# Patient Record
Sex: FEMALE | Race: WHITE | NOT HISPANIC OR LATINO
[De-identification: names, ages, dates, MRNs, and addresses within clinical notes are randomized per-mention and may not be internally consistent; named-entity substitution may affect disease eponyms.]

---

## 2017-09-11 ENCOUNTER — APPOINTMENT (OUTPATIENT)
Dept: RADIOLOGY | Facility: CLINIC | Age: 37
End: 2017-09-11

## 2017-09-11 ENCOUNTER — OUTPATIENT (OUTPATIENT)
Dept: OUTPATIENT SERVICES | Facility: HOSPITAL | Age: 37
LOS: 1 days | End: 2017-09-11
Payer: COMMERCIAL

## 2017-09-11 DIAGNOSIS — Z00.8 ENCOUNTER FOR OTHER GENERAL EXAMINATION: ICD-10-CM

## 2017-09-11 PROCEDURE — 73502 X-RAY EXAM HIP UNI 2-3 VIEWS: CPT | Mod: 26,RT

## 2017-09-11 PROCEDURE — 73502 X-RAY EXAM HIP UNI 2-3 VIEWS: CPT

## 2018-10-09 ENCOUNTER — RESULT REVIEW (OUTPATIENT)
Age: 38
End: 2018-10-09

## 2020-02-23 ENCOUNTER — TRANSCRIPTION ENCOUNTER (OUTPATIENT)
Age: 40
End: 2020-02-23

## 2020-10-01 ENCOUNTER — TRANSCRIPTION ENCOUNTER (OUTPATIENT)
Age: 40
End: 2020-10-01

## 2020-10-01 VITALS
TEMPERATURE: 98 F | SYSTOLIC BLOOD PRESSURE: 98 MMHG | DIASTOLIC BLOOD PRESSURE: 65 MMHG | WEIGHT: 136.25 LBS | OXYGEN SATURATION: 100 % | HEIGHT: 63 IN | HEART RATE: 58 BPM | RESPIRATION RATE: 16 BRPM

## 2020-10-02 ENCOUNTER — RESULT REVIEW (OUTPATIENT)
Age: 40
End: 2020-10-02

## 2020-10-02 ENCOUNTER — INPATIENT (INPATIENT)
Facility: HOSPITAL | Age: 40
LOS: 0 days | Discharge: HOME | DRG: 334 | End: 2020-10-03
Attending: OBSTETRICS & GYNECOLOGY | Admitting: OBSTETRICS & GYNECOLOGY
Payer: COMMERCIAL

## 2020-10-02 DIAGNOSIS — Z90.49 ACQUIRED ABSENCE OF OTHER SPECIFIED PARTS OF DIGESTIVE TRACT: Chronic | ICD-10-CM

## 2020-10-02 DIAGNOSIS — N94.6 DYSMENORRHEA, UNSPECIFIED: ICD-10-CM

## 2020-10-02 DIAGNOSIS — Z91.040 LATEX ALLERGY STATUS: ICD-10-CM

## 2020-10-02 DIAGNOSIS — N80.5 ENDOMETRIOSIS OF INTESTINE: ICD-10-CM

## 2020-10-02 DIAGNOSIS — M50.30 OTHER CERVICAL DISC DEGENERATION, UNSPECIFIED CERVICAL REGION: ICD-10-CM

## 2020-10-02 DIAGNOSIS — M43.16 SPONDYLOLISTHESIS, LUMBAR REGION: ICD-10-CM

## 2020-10-02 DIAGNOSIS — O00.90 UNSPECIFIED ECTOPIC PREGNANCY WITHOUT INTRAUTERINE PREGNANCY: Chronic | ICD-10-CM

## 2020-10-02 DIAGNOSIS — F41.9 ANXIETY DISORDER, UNSPECIFIED: ICD-10-CM

## 2020-10-02 DIAGNOSIS — N80.3 ENDOMETRIOSIS OF PELVIC PERITONEUM: ICD-10-CM

## 2020-10-02 LAB
BLD GP AB SCN SERPL QL: NEGATIVE — SIGNIFICANT CHANGE UP
RH IG SCN BLD-IMP: POSITIVE — SIGNIFICANT CHANGE UP

## 2020-10-02 PROCEDURE — 88302 TISSUE EXAM BY PATHOLOGIST: CPT | Mod: 26

## 2020-10-02 PROCEDURE — 88305 TISSUE EXAM BY PATHOLOGIST: CPT | Mod: 26

## 2020-10-02 RX ORDER — KETOROLAC TROMETHAMINE 30 MG/ML
30 SYRINGE (ML) INJECTION EVERY 6 HOURS
Refills: 0 | Status: DISCONTINUED | OUTPATIENT
Start: 2020-10-02 | End: 2020-10-03

## 2020-10-02 RX ORDER — SIMETHICONE 80 MG/1
80 TABLET, CHEWABLE ORAL ONCE
Refills: 0 | Status: DISCONTINUED | OUTPATIENT
Start: 2020-10-02 | End: 2020-10-03

## 2020-10-02 RX ORDER — POLYETHYLENE GLYCOL 3350 17 G/17G
17 POWDER, FOR SOLUTION ORAL ONCE
Refills: 0 | Status: DISCONTINUED | OUTPATIENT
Start: 2020-10-02 | End: 2020-10-03

## 2020-10-02 RX ORDER — FAMOTIDINE 10 MG/ML
20 INJECTION INTRAVENOUS ONCE
Refills: 0 | Status: COMPLETED | OUTPATIENT
Start: 2020-10-02 | End: 2020-10-02

## 2020-10-02 RX ORDER — SODIUM CHLORIDE 9 MG/ML
1000 INJECTION, SOLUTION INTRAVENOUS
Refills: 0 | Status: DISCONTINUED | OUTPATIENT
Start: 2020-10-02 | End: 2020-10-03

## 2020-10-02 RX ORDER — ACETAMINOPHEN 500 MG
1000 TABLET ORAL EVERY 6 HOURS
Refills: 0 | Status: DISCONTINUED | OUTPATIENT
Start: 2020-10-02 | End: 2020-10-03

## 2020-10-02 RX ORDER — HYDROMORPHONE HYDROCHLORIDE 2 MG/ML
0.5 INJECTION INTRAMUSCULAR; INTRAVENOUS; SUBCUTANEOUS EVERY 4 HOURS
Refills: 0 | Status: DISCONTINUED | OUTPATIENT
Start: 2020-10-02 | End: 2020-10-03

## 2020-10-02 RX ORDER — MEPERIDINE HYDROCHLORIDE 50 MG/ML
12.5 INJECTION INTRAMUSCULAR; INTRAVENOUS; SUBCUTANEOUS ONCE
Refills: 0 | Status: DISCONTINUED | OUTPATIENT
Start: 2020-10-02 | End: 2020-10-02

## 2020-10-02 RX ORDER — ONDANSETRON 8 MG/1
8 TABLET, FILM COATED ORAL EVERY 6 HOURS
Refills: 0 | Status: DISCONTINUED | OUTPATIENT
Start: 2020-10-02 | End: 2020-10-03

## 2020-10-02 RX ORDER — METOCLOPRAMIDE HCL 10 MG
10 TABLET ORAL EVERY 6 HOURS
Refills: 0 | Status: DISCONTINUED | OUTPATIENT
Start: 2020-10-02 | End: 2020-10-03

## 2020-10-02 RX ORDER — ACETAMINOPHEN 500 MG
1000 TABLET ORAL ONCE
Refills: 0 | Status: COMPLETED | OUTPATIENT
Start: 2020-10-02 | End: 2020-10-02

## 2020-10-02 RX ORDER — GABAPENTIN 400 MG/1
600 CAPSULE ORAL ONCE
Refills: 0 | Status: COMPLETED | OUTPATIENT
Start: 2020-10-02 | End: 2020-10-02

## 2020-10-02 RX ORDER — BUPIVACAINE 13.3 MG/ML
20 INJECTION, SUSPENSION, LIPOSOMAL INFILTRATION ONCE
Refills: 0 | Status: DISCONTINUED | OUTPATIENT
Start: 2020-10-02 | End: 2020-10-03

## 2020-10-02 RX ORDER — HEPARIN SODIUM 5000 [USP'U]/ML
5000 INJECTION INTRAVENOUS; SUBCUTANEOUS ONCE
Refills: 0 | Status: COMPLETED | OUTPATIENT
Start: 2020-10-02 | End: 2020-10-02

## 2020-10-02 RX ORDER — HYDROMORPHONE HYDROCHLORIDE 2 MG/ML
0.5 INJECTION INTRAMUSCULAR; INTRAVENOUS; SUBCUTANEOUS
Refills: 0 | Status: DISCONTINUED | OUTPATIENT
Start: 2020-10-02 | End: 2020-10-02

## 2020-10-02 RX ORDER — SCOPALAMINE 1 MG/3D
1 PATCH, EXTENDED RELEASE TRANSDERMAL
Refills: 0 | Status: DISCONTINUED | OUTPATIENT
Start: 2020-10-02 | End: 2020-10-03

## 2020-10-02 RX ORDER — CELECOXIB 200 MG/1
400 CAPSULE ORAL ONCE
Refills: 0 | Status: COMPLETED | OUTPATIENT
Start: 2020-10-02 | End: 2020-10-02

## 2020-10-02 RX ADMIN — Medication 30 MILLIGRAM(S): at 11:47

## 2020-10-02 RX ADMIN — Medication 30 MILLIGRAM(S): at 12:15

## 2020-10-02 RX ADMIN — SCOPALAMINE 1 PATCH: 1 PATCH, EXTENDED RELEASE TRANSDERMAL at 18:47

## 2020-10-02 RX ADMIN — HYDROMORPHONE HYDROCHLORIDE 0.5 MILLIGRAM(S): 2 INJECTION INTRAMUSCULAR; INTRAVENOUS; SUBCUTANEOUS at 14:44

## 2020-10-02 RX ADMIN — HEPARIN SODIUM 5000 UNIT(S): 5000 INJECTION INTRAVENOUS; SUBCUTANEOUS at 07:19

## 2020-10-02 RX ADMIN — HYDROMORPHONE HYDROCHLORIDE 0.5 MILLIGRAM(S): 2 INJECTION INTRAMUSCULAR; INTRAVENOUS; SUBCUTANEOUS at 20:41

## 2020-10-02 RX ADMIN — Medication 10 MILLIGRAM(S): at 15:38

## 2020-10-02 RX ADMIN — HYDROMORPHONE HYDROCHLORIDE 0.5 MILLIGRAM(S): 2 INJECTION INTRAMUSCULAR; INTRAVENOUS; SUBCUTANEOUS at 15:40

## 2020-10-02 RX ADMIN — Medication 30 MILLIGRAM(S): at 17:31

## 2020-10-02 RX ADMIN — CELECOXIB 400 MILLIGRAM(S): 200 CAPSULE ORAL at 14:44

## 2020-10-02 RX ADMIN — SCOPALAMINE 1 PATCH: 1 PATCH, EXTENDED RELEASE TRANSDERMAL at 19:33

## 2020-10-02 RX ADMIN — Medication 1000 MILLIGRAM(S): at 07:20

## 2020-10-02 RX ADMIN — HYDROMORPHONE HYDROCHLORIDE 0.5 MILLIGRAM(S): 2 INJECTION INTRAMUSCULAR; INTRAVENOUS; SUBCUTANEOUS at 21:32

## 2020-10-02 RX ADMIN — HYDROMORPHONE HYDROCHLORIDE 0.5 MILLIGRAM(S): 2 INJECTION INTRAMUSCULAR; INTRAVENOUS; SUBCUTANEOUS at 11:47

## 2020-10-02 RX ADMIN — Medication 1000 MILLIGRAM(S): at 14:44

## 2020-10-02 RX ADMIN — HYDROMORPHONE HYDROCHLORIDE 0.5 MILLIGRAM(S): 2 INJECTION INTRAMUSCULAR; INTRAVENOUS; SUBCUTANEOUS at 12:58

## 2020-10-02 RX ADMIN — Medication 1000 MILLIGRAM(S): at 14:51

## 2020-10-02 RX ADMIN — Medication 30 MILLIGRAM(S): at 17:55

## 2020-10-02 RX ADMIN — SODIUM CHLORIDE 125 MILLILITER(S): 9 INJECTION, SOLUTION INTRAVENOUS at 14:56

## 2020-10-02 RX ADMIN — HYDROMORPHONE HYDROCHLORIDE 0.5 MILLIGRAM(S): 2 INJECTION INTRAMUSCULAR; INTRAVENOUS; SUBCUTANEOUS at 16:37

## 2020-10-02 RX ADMIN — FAMOTIDINE 20 MILLIGRAM(S): 10 INJECTION INTRAVENOUS at 18:48

## 2020-10-02 RX ADMIN — Medication 1000 MILLIGRAM(S): at 15:36

## 2020-10-02 RX ADMIN — GABAPENTIN 600 MILLIGRAM(S): 400 CAPSULE ORAL at 07:19

## 2020-10-02 RX ADMIN — HYDROMORPHONE HYDROCHLORIDE 0.5 MILLIGRAM(S): 2 INJECTION INTRAMUSCULAR; INTRAVENOUS; SUBCUTANEOUS at 13:35

## 2020-10-02 RX ADMIN — HYDROMORPHONE HYDROCHLORIDE 0.5 MILLIGRAM(S): 2 INJECTION INTRAMUSCULAR; INTRAVENOUS; SUBCUTANEOUS at 13:51

## 2020-10-02 RX ADMIN — CELECOXIB 400 MILLIGRAM(S): 200 CAPSULE ORAL at 07:20

## 2020-10-02 RX ADMIN — ONDANSETRON 8 MILLIGRAM(S): 8 TABLET, FILM COATED ORAL at 20:41

## 2020-10-02 RX ADMIN — ONDANSETRON 8 MILLIGRAM(S): 8 TABLET, FILM COATED ORAL at 14:51

## 2020-10-02 RX ADMIN — HYDROMORPHONE HYDROCHLORIDE 0.5 MILLIGRAM(S): 2 INJECTION INTRAMUSCULAR; INTRAVENOUS; SUBCUTANEOUS at 12:15

## 2020-10-02 NOTE — H&P ADULT - NSICDXPASTMEDICALHX_GEN_ALL_CORE_FT
PAST MEDICAL HISTORY:  DDD (degenerative disc disease), cervical     Endometriosis     Migraines

## 2020-10-02 NOTE — PROGRESS NOTE ADULT - SUBJECTIVE AND OBJECTIVE BOX
GYN POST-OPERATIVE CHECK  Patient seen at bedside.  Pain moderately controlled. Tolerating sips.  No OOB yet.  Jimenez in place draining clear yellow urine.    Denies CP, palpitations, SOB, fever, chills. Reports nausea, patient gotten reglan and zofran.    Vital Signs Last 24 Hrs  T(C): 36.3 (02 Oct 2020 15:42), Max: 36.6 (02 Oct 2020 14:12)  T(F): 97.3 (02 Oct 2020 15:42), Max: 97.8 (02 Oct 2020 14:12)  HR: 88 (02 Oct 2020 15:42) (60 - 88)  BP: 106/62 (02 Oct 2020 15:42) (101/56 - 204/138)  BP(mean): 77 (02 Oct 2020 13:33) (77 - 166)  RR: 19 (02 Oct 2020 15:42) (18 - 37)  SpO2: 98% (02 Oct 2020 15:42) (97% - 100%)    Physical Exam:  Gen: No Acute Distress  Pulm: Clear to auscultation bilaterally  GI: soft, appropriately tender, non-distended, +BS, no rebound, no guarding.  Dressing C/D/I  : Jimenez in place  Ext: SCDs in place, no edema/erythema/tenderness    I&O's Summary    02 Oct 2020 07:01  -  02 Oct 2020 17:10  --------------------------------------------------------  IN: 625 mL / OUT: 415 mL / NET: 210 mL      MEDICATIONS  (STANDING):  acetaminophen   Tablet .. 1000 milliGRAM(s) Oral every 6 hours  BUpivacaine liposome 1.3% Injectable (no eMAR) 20 milliLiter(s) Local Injection Once  ketorolac   Injectable 30 milliGRAM(s) IV Push every 6 hours  lactated ringers. 1000 milliLiter(s) (125 mL/Hr) IV Continuous <Continuous>    MEDICATIONS  (PRN):  HYDROmorphone  Injectable 0.5 milliGRAM(s) IV Push every 4 hours PRN Severe Pain (7 - 10)  metoclopramide Injectable 10 milliGRAM(s) IV Push every 6 hours PRN vomiting  ondansetron Injectable 8 milliGRAM(s) IV Push every 6 hours PRN Nausea  polyethylene glycol 3350 17 Gram(s) Oral Once PRN Constipation  simethicone 80 milliGRAM(s) Chew Once PRN Gas    Allergies    Chicken (Other)  latex (Other)  Vicodin (Other)    Intolerances        LABS:

## 2020-10-02 NOTE — H&P ADULT - HISTORY OF PRESENT ILLNESS
39y  with Last Menstrual Period  presenting for L/S endo resection.  Known endometriosis with multiple GI symptoms.  Denies fever, chills, chest pain, palpitations, SOB, n/v.  +flatus, +BM    OB H/x:  EUP treated with right salpingectomy    GYN H/x:  Endometriosis    MED H/x:  Spondolythesis (L5 bulging disc)    SURG H/x:  2013 - laparotomy for EUP  2016 L/s cholecystectomy    Medications:  None    Allergies:   Vicodin (acne and itching)     Vital Signs Last 24 Hrs  T(C): 36.3 (02 Oct 2020 15:42), Max: 36.6 (02 Oct 2020 14:12)  T(F): 97.3 (02 Oct 2020 15:42), Max: 97.8 (02 Oct 2020 14:12)  HR: 88 (02 Oct 2020 15:42) (60 - 88)  BP: 106/62 (02 Oct 2020 15:42) (101/56 - 204/138)  BP(mean): 77 (02 Oct 2020 13:33) (77 - 166)  RR: 19 (02 Oct 2020 15:42) (18 - 37)  SpO2: 98% (02 Oct 2020 15:42) (97% - 100%)    Physical Exam:  Gen: NAD, comfortable  GI: soft, nontender, nondistended + BS, no rebound no guarding  Ext: no edema, erythema, tenderness     LABS:  HB 12.6  Cr 0.76

## 2020-10-02 NOTE — BRIEF OPERATIVE NOTE - OPERATION/FINDINGS
L/S Endo-resection with appendectomy.  s/p previous right salpingectomy, otherwise normal anatomy.  The diaphragm was inspected to lesions or defects.  Normal left adnexa and normal right ovary.  Peritoneal lesions were removed.

## 2020-10-02 NOTE — PROGRESS NOTE ADULT - ASSESSMENT
39y Female POD# 0 s/p L/S endo resection and appendectomy with exploration of the diaphragm. Bedrest until ovarian suspensions are removed tomorrow morning.                                   1. Neuro/Pain:  PCA. Zofran and reglan for N/V  2  CV:   VS per routine  3. Pulm: Encourage ISS  4. GI: NPO + Sips, Advance in the AM  5. :  Jimenez in place, TOV in AM  6. Heme: AM CBC  7. DVT ppx: SCDs  8. Dispo: Continue inpatient monitoring

## 2020-10-02 NOTE — BRIEF OPERATIVE NOTE - NSICDXBRIEFPROCEDURE_GEN_ALL_CORE_FT
PROCEDURES:  Laparoscopic appendectomy 02-Oct-2020 10:59:01  Kale Chappell  Laparoscopic removal of endometriosis 02-Oct-2020 10:58:52  Kale Chappell

## 2020-10-03 ENCOUNTER — TRANSCRIPTION ENCOUNTER (OUTPATIENT)
Age: 40
End: 2020-10-03

## 2020-10-03 VITALS
SYSTOLIC BLOOD PRESSURE: 95 MMHG | RESPIRATION RATE: 18 BRPM | DIASTOLIC BLOOD PRESSURE: 52 MMHG | TEMPERATURE: 99 F | OXYGEN SATURATION: 98 % | HEART RATE: 75 BPM

## 2020-10-03 DIAGNOSIS — Z98.890 OTHER SPECIFIED POSTPROCEDURAL STATES: ICD-10-CM

## 2020-10-03 LAB
ANION GAP SERPL CALC-SCNC: 7 MMOL/L — SIGNIFICANT CHANGE UP (ref 5–17)
BUN SERPL-MCNC: 8 MG/DL — SIGNIFICANT CHANGE UP (ref 7–23)
CALCIUM SERPL-MCNC: 8.5 MG/DL — SIGNIFICANT CHANGE UP (ref 8.4–10.5)
CHLORIDE SERPL-SCNC: 105 MMOL/L — SIGNIFICANT CHANGE UP (ref 96–108)
CO2 SERPL-SCNC: 25 MMOL/L — SIGNIFICANT CHANGE UP (ref 22–31)
CREAT SERPL-MCNC: 0.76 MG/DL — SIGNIFICANT CHANGE UP (ref 0.5–1.3)
GLUCOSE SERPL-MCNC: 108 MG/DL — HIGH (ref 70–99)
HCT VFR BLD CALC: 32.3 % — LOW (ref 34.5–45)
HGB BLD-MCNC: 10.8 G/DL — LOW (ref 11.5–15.5)
MCHC RBC-ENTMCNC: 32.2 PG — SIGNIFICANT CHANGE UP (ref 27–34)
MCHC RBC-ENTMCNC: 33.4 GM/DL — SIGNIFICANT CHANGE UP (ref 32–36)
MCV RBC AUTO: 96.4 FL — SIGNIFICANT CHANGE UP (ref 80–100)
NRBC # BLD: 0 /100 WBCS — SIGNIFICANT CHANGE UP (ref 0–0)
PLATELET # BLD AUTO: 189 K/UL — SIGNIFICANT CHANGE UP (ref 150–400)
POTASSIUM SERPL-MCNC: 4.8 MMOL/L — SIGNIFICANT CHANGE UP (ref 3.5–5.3)
POTASSIUM SERPL-SCNC: 4.8 MMOL/L — SIGNIFICANT CHANGE UP (ref 3.5–5.3)
RBC # BLD: 3.35 M/UL — LOW (ref 3.8–5.2)
RBC # FLD: 11.9 % — SIGNIFICANT CHANGE UP (ref 10.3–14.5)
SODIUM SERPL-SCNC: 137 MMOL/L — SIGNIFICANT CHANGE UP (ref 135–145)
WBC # BLD: 14.61 K/UL — HIGH (ref 3.8–10.5)
WBC # FLD AUTO: 14.61 K/UL — HIGH (ref 3.8–10.5)

## 2020-10-03 RX ORDER — IBUPROFEN 200 MG
600 TABLET ORAL EVERY 6 HOURS
Refills: 0 | Status: DISCONTINUED | OUTPATIENT
Start: 2020-10-03 | End: 2020-10-03

## 2020-10-03 RX ORDER — IBUPROFEN 200 MG
1 TABLET ORAL
Qty: 0 | Refills: 0 | DISCHARGE
Start: 2020-10-03

## 2020-10-03 RX ADMIN — Medication 30 MILLIGRAM(S): at 00:27

## 2020-10-03 RX ADMIN — HYDROMORPHONE HYDROCHLORIDE 0.5 MILLIGRAM(S): 2 INJECTION INTRAMUSCULAR; INTRAVENOUS; SUBCUTANEOUS at 04:39

## 2020-10-03 RX ADMIN — Medication 600 MILLIGRAM(S): at 11:25

## 2020-10-03 RX ADMIN — Medication 30 MILLIGRAM(S): at 07:34

## 2020-10-03 RX ADMIN — SODIUM CHLORIDE 125 MILLILITER(S): 9 INJECTION, SOLUTION INTRAVENOUS at 00:25

## 2020-10-03 RX ADMIN — Medication 30 MILLIGRAM(S): at 07:06

## 2020-10-03 RX ADMIN — ONDANSETRON 8 MILLIGRAM(S): 8 TABLET, FILM COATED ORAL at 03:27

## 2020-10-03 RX ADMIN — SCOPALAMINE 1 PATCH: 1 PATCH, EXTENDED RELEASE TRANSDERMAL at 07:09

## 2020-10-03 RX ADMIN — Medication 30 MILLIGRAM(S): at 00:45

## 2020-10-03 RX ADMIN — HYDROMORPHONE HYDROCHLORIDE 0.5 MILLIGRAM(S): 2 INJECTION INTRAMUSCULAR; INTRAVENOUS; SUBCUTANEOUS at 03:27

## 2020-10-03 RX ADMIN — Medication 600 MILLIGRAM(S): at 12:25

## 2020-10-03 NOTE — DISCHARGE NOTE PROVIDER - HOSPITAL COURSE
38yo G0 presented for scheduled operation underwent l/s endo resection and appendectomy w/exploration of the diaphragm (no VATS). Procedure was uncomplicated EBL 50cc post op hg 10.8 stable. Meeting all postoperative milestones. Stable for discharge

## 2020-10-03 NOTE — DISCHARGE NOTE NURSING/CASE MANAGEMENT/SOCIAL WORK - PATIENT PORTAL LINK FT
You can access the FollowMyHealth Patient Portal offered by [facility] by registering at the following website: [email or web address]. By joining [facility]’s FollowMyHealth portal, you will also be able to view your health information using other applications (apps) compatible with our system.

## 2020-10-03 NOTE — DISCHARGE NOTE PROVIDER - CARE PROVIDER_API CALL
Juan Stevens  OBSTETRICS AND GYNECOLOGY  [address] [place] [address]  Phone: [phone]  Fax: [phone]  Follow Up Time:

## 2020-10-03 NOTE — DISCHARGE NOTE PROVIDER - NSDCCPCAREPLAN_GEN_ALL_CORE_FT
PRINCIPAL DISCHARGE DIAGNOSIS  Diagnosis: Postoperative state  Assessment and Plan of Treatment: Postoperative state

## 2020-10-03 NOTE — PROGRESS NOTE ADULT - PROBLEM SELECTOR PLAN 1
Neuro: Pain well controlled; continue regimen continue Zofran and reglan   CV: hemodynamically stable, monitor vitals  Pulm: saturating well on room air, encourage oob/amb  GI: advance diet as tolerated; stop fluids and SLIV   : remove espinoza; patient due to void   Heme: SCDs for DVT prophylaxis; f/u AM CBC   ID: afebrile  Dispo: continue routine post-op care    Hermna Galindo PGY2

## 2020-10-03 NOTE — PROGRESS NOTE ADULT - ASSESSMENT
39y POD#1   s/p l/s endometriosis excision w/ appendectomy and exploration of diaphragm 2/2 without complication. Patient is stable and doing well; nausea improved with antiemetics.

## 2020-10-03 NOTE — PROGRESS NOTE ADULT - SUBJECTIVE AND OBJECTIVE BOX
R2 GYN Progress Note  POD#1   HD#2    Patient seen and examined at bedside. Patient struggled with nausea last night w/ a few episodes of emesis; however much improved Zofran and scopolamine patch. She is now tolerating sips; has not passed flatus yet.     Jimenez is still in place.   Denies CP, SOB, N/V, fevers, and chills, heavy vaginal bleeding.     Vital Signs Last 24 Hours  T(C): 37.2 (10-03-20 @ 05:40), Max: 37.2 (10-03-20 @ 05:40)  HR: 70 (10-03-20 @ 05:40) (59 - 88)  BP: 90/45 (10-03-20 @ 05:40) (90/45 - 204/138)  RR: 18 (10-03-20 @ 05:40) (17 - 37)  SpO2: 97% (10-03-20 @ 05:40) (97% - 100%)    I&O's Summary    02 Oct 2020 07:01  -  03 Oct 2020 06:03  --------------------------------------------------------  IN: 2000 mL / OUT: 865 mL / NET: 1135 mL        Physical Exam:  General: NAD  CV: RR, S1, S2  Lungs: CTA b/l, good air flow b/l   Abdomen: Soft, appropriately tender to palpation, non distended; bowel sounds present in all quadrants  Incision: incision sites, dressing, CDI; steris in place, dry; b/l ovarian suspensions removed   Ext: warm and well perfused    Labs:      MEDICATIONS  (STANDING):  acetaminophen   Tablet .. 1000 milliGRAM(s) Oral every 6 hours  BUpivacaine liposome 1.3% Injectable (no eMAR) 20 milliLiter(s) Local Injection Once  ketorolac   Injectable 30 milliGRAM(s) IV Push every 6 hours  lactated ringers. 1000 milliLiter(s) (125 mL/Hr) IV Continuous <Continuous>  scopolamine 1 mG/72 Hr(s) Patch 1 Patch Transdermal every 72 hours    MEDICATIONS  (PRN):  metoclopramide Injectable 10 milliGRAM(s) IV Push every 6 hours PRN vomiting  ondansetron Injectable 8 milliGRAM(s) IV Push every 6 hours PRN Nausea  polyethylene glycol 3350 17 Gram(s) Oral Once PRN Constipation  simethicone 80 milliGRAM(s) Chew Once PRN Gas

## 2020-10-05 PROCEDURE — 86901 BLOOD TYPING SEROLOGIC RH(D): CPT

## 2020-10-05 PROCEDURE — 85027 COMPLETE CBC AUTOMATED: CPT

## 2020-10-05 PROCEDURE — 86850 RBC ANTIBODY SCREEN: CPT

## 2020-10-05 PROCEDURE — 88302 TISSUE EXAM BY PATHOLOGIST: CPT

## 2020-10-05 PROCEDURE — 80048 BASIC METABOLIC PNL TOTAL CA: CPT

## 2020-10-05 PROCEDURE — C9399: CPT

## 2020-10-05 PROCEDURE — 86900 BLOOD TYPING SEROLOGIC ABO: CPT

## 2020-10-05 PROCEDURE — 88305 TISSUE EXAM BY PATHOLOGIST: CPT

## 2020-10-09 LAB — SURGICAL PATHOLOGY STUDY: SIGNIFICANT CHANGE UP

## 2020-12-10 ENCOUNTER — OUTPATIENT (OUTPATIENT)
Dept: OUTPATIENT SERVICES | Facility: HOSPITAL | Age: 40
LOS: 1 days | End: 2020-12-10
Payer: COMMERCIAL

## 2020-12-10 DIAGNOSIS — O00.90 UNSPECIFIED ECTOPIC PREGNANCY WITHOUT INTRAUTERINE PREGNANCY: Chronic | ICD-10-CM

## 2020-12-10 DIAGNOSIS — Z11.59 ENCOUNTER FOR SCREENING FOR OTHER VIRAL DISEASES: ICD-10-CM

## 2020-12-10 DIAGNOSIS — Z90.49 ACQUIRED ABSENCE OF OTHER SPECIFIED PARTS OF DIGESTIVE TRACT: Chronic | ICD-10-CM

## 2020-12-10 PROBLEM — N80.9 ENDOMETRIOSIS, UNSPECIFIED: Chronic | Status: ACTIVE | Noted: 2020-10-01

## 2020-12-10 PROBLEM — G43.909 MIGRAINE, UNSPECIFIED, NOT INTRACTABLE, WITHOUT STATUS MIGRAINOSUS: Chronic | Status: ACTIVE | Noted: 2020-10-01

## 2020-12-10 PROBLEM — M50.30 OTHER CERVICAL DISC DEGENERATION, UNSPECIFIED CERVICAL REGION: Chronic | Status: ACTIVE | Noted: 2020-10-01

## 2020-12-10 LAB — SARS-COV-2 RNA SPEC QL NAA+PROBE: SIGNIFICANT CHANGE UP

## 2020-12-10 PROCEDURE — U0003: CPT

## 2020-12-11 DIAGNOSIS — Z11.59 ENCOUNTER FOR SCREENING FOR OTHER VIRAL DISEASES: ICD-10-CM

## 2021-06-30 ENCOUNTER — EMERGENCY (EMERGENCY)
Facility: HOSPITAL | Age: 41
LOS: 1 days | Discharge: HOME | End: 2021-06-30
Attending: STUDENT IN AN ORGANIZED HEALTH CARE EDUCATION/TRAINING PROGRAM | Admitting: STUDENT IN AN ORGANIZED HEALTH CARE EDUCATION/TRAINING PROGRAM
Payer: COMMERCIAL

## 2021-06-30 VITALS
OXYGEN SATURATION: 100 % | HEIGHT: 63 IN | RESPIRATION RATE: 16 BRPM | TEMPERATURE: 98 F | DIASTOLIC BLOOD PRESSURE: 61 MMHG | HEART RATE: 86 BPM | SYSTOLIC BLOOD PRESSURE: 101 MMHG

## 2021-06-30 VITALS
DIASTOLIC BLOOD PRESSURE: 63 MMHG | OXYGEN SATURATION: 100 % | SYSTOLIC BLOOD PRESSURE: 115 MMHG | TEMPERATURE: 98 F | RESPIRATION RATE: 18 BRPM | HEART RATE: 68 BPM

## 2021-06-30 DIAGNOSIS — O00.90 UNSPECIFIED ECTOPIC PREGNANCY WITHOUT INTRAUTERINE PREGNANCY: Chronic | ICD-10-CM

## 2021-06-30 DIAGNOSIS — Z90.49 ACQUIRED ABSENCE OF OTHER SPECIFIED PARTS OF DIGESTIVE TRACT: Chronic | ICD-10-CM

## 2021-06-30 LAB
ALBUMIN SERPL ELPH-MCNC: 4.4 G/DL — SIGNIFICANT CHANGE UP (ref 3.3–5)
ALP SERPL-CCNC: 26 U/L — LOW (ref 40–120)
ALT FLD-CCNC: 10 U/L — SIGNIFICANT CHANGE UP (ref 4–33)
ANION GAP SERPL CALC-SCNC: 12 MMOL/L — SIGNIFICANT CHANGE UP (ref 7–14)
APPEARANCE UR: CLEAR — SIGNIFICANT CHANGE UP
AST SERPL-CCNC: 13 U/L — SIGNIFICANT CHANGE UP (ref 4–32)
BACTERIA # UR AUTO: ABNORMAL
BASE EXCESS BLDV CALC-SCNC: 0.5 MMOL/L — SIGNIFICANT CHANGE UP (ref -3–2)
BASOPHILS # BLD AUTO: 0.03 K/UL — SIGNIFICANT CHANGE UP (ref 0–0.2)
BASOPHILS NFR BLD AUTO: 0.4 % — SIGNIFICANT CHANGE UP (ref 0–2)
BILIRUB SERPL-MCNC: 0.6 MG/DL — SIGNIFICANT CHANGE UP (ref 0.2–1.2)
BILIRUB UR-MCNC: NEGATIVE — SIGNIFICANT CHANGE UP
BLOOD GAS VENOUS - CREATININE: 0.8 MG/DL — SIGNIFICANT CHANGE UP (ref 0.5–1.3)
BLOOD GAS VENOUS COMPREHENSIVE RESULT: SIGNIFICANT CHANGE UP
BUN SERPL-MCNC: 13 MG/DL — SIGNIFICANT CHANGE UP (ref 7–23)
CALCIUM SERPL-MCNC: 9.3 MG/DL — SIGNIFICANT CHANGE UP (ref 8.4–10.5)
CHLORIDE BLDV-SCNC: 109 MMOL/L — HIGH (ref 96–108)
CHLORIDE SERPL-SCNC: 104 MMOL/L — SIGNIFICANT CHANGE UP (ref 98–107)
CO2 SERPL-SCNC: 23 MMOL/L — SIGNIFICANT CHANGE UP (ref 22–31)
COLOR SPEC: YELLOW — SIGNIFICANT CHANGE UP
CREAT SERPL-MCNC: 0.79 MG/DL — SIGNIFICANT CHANGE UP (ref 0.5–1.3)
DIFF PNL FLD: NEGATIVE — SIGNIFICANT CHANGE UP
EOSINOPHIL # BLD AUTO: 0.09 K/UL — SIGNIFICANT CHANGE UP (ref 0–0.5)
EOSINOPHIL NFR BLD AUTO: 1.3 % — SIGNIFICANT CHANGE UP (ref 0–6)
EPI CELLS # UR: 9 /HPF — HIGH (ref 0–5)
GAS PNL BLDV: 136 MMOL/L — SIGNIFICANT CHANGE UP (ref 136–146)
GLUCOSE BLDV-MCNC: 87 MG/DL — SIGNIFICANT CHANGE UP (ref 70–99)
GLUCOSE SERPL-MCNC: 87 MG/DL — SIGNIFICANT CHANGE UP (ref 70–99)
GLUCOSE UR QL: NEGATIVE — SIGNIFICANT CHANGE UP
HCO3 BLDV-SCNC: 24 MMOL/L — SIGNIFICANT CHANGE UP (ref 20–27)
HCT VFR BLD CALC: 34.5 % — SIGNIFICANT CHANGE UP (ref 34.5–45)
HCT VFR BLDA CALC: 37.7 % — SIGNIFICANT CHANGE UP (ref 34.5–46.5)
HGB BLD CALC-MCNC: 12.3 G/DL — SIGNIFICANT CHANGE UP (ref 11.5–15.5)
HGB BLD-MCNC: 11.9 G/DL — SIGNIFICANT CHANGE UP (ref 11.5–15.5)
HYALINE CASTS # UR AUTO: 2 /LPF — SIGNIFICANT CHANGE UP (ref 0–7)
IANC: 3.7 K/UL — SIGNIFICANT CHANGE UP (ref 1.5–8.5)
IMM GRANULOCYTES NFR BLD AUTO: 0.3 % — SIGNIFICANT CHANGE UP (ref 0–1.5)
KETONES UR-MCNC: ABNORMAL
LACTATE BLDV-MCNC: 1 MMOL/L — SIGNIFICANT CHANGE UP (ref 0.5–2)
LEUKOCYTE ESTERASE UR-ACNC: NEGATIVE — SIGNIFICANT CHANGE UP
LIDOCAIN IGE QN: 24 U/L — SIGNIFICANT CHANGE UP (ref 7–60)
LYMPHOCYTES # BLD AUTO: 2.5 K/UL — SIGNIFICANT CHANGE UP (ref 1–3.3)
LYMPHOCYTES # BLD AUTO: 37 % — SIGNIFICANT CHANGE UP (ref 13–44)
MCHC RBC-ENTMCNC: 32.5 PG — SIGNIFICANT CHANGE UP (ref 27–34)
MCHC RBC-ENTMCNC: 34.5 GM/DL — SIGNIFICANT CHANGE UP (ref 32–36)
MCV RBC AUTO: 94.3 FL — SIGNIFICANT CHANGE UP (ref 80–100)
MONOCYTES # BLD AUTO: 0.42 K/UL — SIGNIFICANT CHANGE UP (ref 0–0.9)
MONOCYTES NFR BLD AUTO: 6.2 % — SIGNIFICANT CHANGE UP (ref 2–14)
NEUTROPHILS # BLD AUTO: 3.7 K/UL — SIGNIFICANT CHANGE UP (ref 1.8–7.4)
NEUTROPHILS NFR BLD AUTO: 54.8 % — SIGNIFICANT CHANGE UP (ref 43–77)
NITRITE UR-MCNC: NEGATIVE — SIGNIFICANT CHANGE UP
NRBC # BLD: 0 /100 WBCS — SIGNIFICANT CHANGE UP
NRBC # FLD: 0 K/UL — SIGNIFICANT CHANGE UP
PCO2 BLDV: 42 MMHG — SIGNIFICANT CHANGE UP (ref 41–51)
PH BLDV: 7.39 — SIGNIFICANT CHANGE UP (ref 7.32–7.43)
PH UR: 6.5 — SIGNIFICANT CHANGE UP (ref 5–8)
PLATELET # BLD AUTO: 234 K/UL — SIGNIFICANT CHANGE UP (ref 150–400)
PO2 BLDV: 36 MMHG — SIGNIFICANT CHANGE UP (ref 35–40)
POTASSIUM BLDV-SCNC: 3.6 MMOL/L — SIGNIFICANT CHANGE UP (ref 3.4–4.5)
POTASSIUM SERPL-MCNC: 3.8 MMOL/L — SIGNIFICANT CHANGE UP (ref 3.5–5.3)
POTASSIUM SERPL-SCNC: 3.8 MMOL/L — SIGNIFICANT CHANGE UP (ref 3.5–5.3)
PROT SERPL-MCNC: 6.9 G/DL — SIGNIFICANT CHANGE UP (ref 6–8.3)
PROT UR-MCNC: ABNORMAL
RBC # BLD: 3.66 M/UL — LOW (ref 3.8–5.2)
RBC # FLD: 11.5 % — SIGNIFICANT CHANGE UP (ref 10.3–14.5)
RBC CASTS # UR COMP ASSIST: 4 /HPF — SIGNIFICANT CHANGE UP (ref 0–4)
SAO2 % BLDV: 65.4 % — SIGNIFICANT CHANGE UP (ref 60–85)
SODIUM SERPL-SCNC: 139 MMOL/L — SIGNIFICANT CHANGE UP (ref 135–145)
SP GR SPEC: 1.03 — HIGH (ref 1.01–1.02)
TROPONIN T, HIGH SENSITIVITY RESULT: <6 NG/L — SIGNIFICANT CHANGE UP
UROBILINOGEN FLD QL: ABNORMAL
WBC # BLD: 6.76 K/UL — SIGNIFICANT CHANGE UP (ref 3.8–10.5)
WBC # FLD AUTO: 6.76 K/UL — SIGNIFICANT CHANGE UP (ref 3.8–10.5)
WBC UR QL: 2 /HPF — SIGNIFICANT CHANGE UP (ref 0–5)

## 2021-06-30 PROCEDURE — 93010 ELECTROCARDIOGRAM REPORT: CPT | Mod: 59

## 2021-06-30 PROCEDURE — 71275 CT ANGIOGRAPHY CHEST: CPT | Mod: 26

## 2021-06-30 PROCEDURE — 74174 CTA ABD&PLVS W/CONTRAST: CPT | Mod: 26

## 2021-06-30 PROCEDURE — 99285 EMERGENCY DEPT VISIT HI MDM: CPT | Mod: 25

## 2021-06-30 RX ORDER — MORPHINE SULFATE 50 MG/1
1 CAPSULE, EXTENDED RELEASE ORAL
Qty: 8 | Refills: 0
Start: 2021-06-30

## 2021-06-30 RX ORDER — SODIUM CHLORIDE 9 MG/ML
1000 INJECTION INTRAMUSCULAR; INTRAVENOUS; SUBCUTANEOUS ONCE
Refills: 0 | Status: COMPLETED | OUTPATIENT
Start: 2021-06-30 | End: 2021-06-30

## 2021-06-30 RX ORDER — KETOROLAC TROMETHAMINE 30 MG/ML
15 SYRINGE (ML) INJECTION ONCE
Refills: 0 | Status: DISCONTINUED | OUTPATIENT
Start: 2021-06-30 | End: 2021-06-30

## 2021-06-30 RX ORDER — DIAZEPAM 5 MG
5 TABLET ORAL ONCE
Refills: 0 | Status: DISCONTINUED | OUTPATIENT
Start: 2021-06-30 | End: 2021-06-30

## 2021-06-30 RX ORDER — ONDANSETRON 8 MG/1
4 TABLET, FILM COATED ORAL ONCE
Refills: 0 | Status: COMPLETED | OUTPATIENT
Start: 2021-06-30 | End: 2021-06-30

## 2021-06-30 RX ORDER — MORPHINE SULFATE 50 MG/1
4 CAPSULE, EXTENDED RELEASE ORAL ONCE
Refills: 0 | Status: DISCONTINUED | OUTPATIENT
Start: 2021-06-30 | End: 2021-06-30

## 2021-06-30 RX ORDER — IBUPROFEN 200 MG
1 TABLET ORAL
Qty: 21 | Refills: 0
Start: 2021-06-30 | End: 2021-07-06

## 2021-06-30 RX ADMIN — SODIUM CHLORIDE 1000 MILLILITER(S): 9 INJECTION INTRAMUSCULAR; INTRAVENOUS; SUBCUTANEOUS at 17:47

## 2021-06-30 RX ADMIN — MORPHINE SULFATE 4 MILLIGRAM(S): 50 CAPSULE, EXTENDED RELEASE ORAL at 22:09

## 2021-06-30 RX ADMIN — ONDANSETRON 4 MILLIGRAM(S): 8 TABLET, FILM COATED ORAL at 22:10

## 2021-06-30 RX ADMIN — Medication 15 MILLIGRAM(S): at 19:00

## 2021-06-30 RX ADMIN — ONDANSETRON 4 MILLIGRAM(S): 8 TABLET, FILM COATED ORAL at 17:47

## 2021-06-30 RX ADMIN — Medication 15 MILLIGRAM(S): at 19:14

## 2021-06-30 RX ADMIN — ONDANSETRON 4 MILLIGRAM(S): 8 TABLET, FILM COATED ORAL at 20:43

## 2021-06-30 RX ADMIN — SODIUM CHLORIDE 1000 MILLILITER(S): 9 INJECTION INTRAMUSCULAR; INTRAVENOUS; SUBCUTANEOUS at 19:14

## 2021-06-30 RX ADMIN — Medication 15 MILLIGRAM(S): at 17:47

## 2021-06-30 RX ADMIN — MORPHINE SULFATE 4 MILLIGRAM(S): 50 CAPSULE, EXTENDED RELEASE ORAL at 19:14

## 2021-06-30 RX ADMIN — Medication 5 MILLIGRAM(S): at 22:53

## 2021-06-30 NOTE — ED ADULT NURSE NOTE - OBJECTIVE STATEMENT
Pt. is A&Ox4 ambulatory presents to ER c/o chest pain, left flank pain and difficulty urinating for 4 days. Pt. denies dysuria, polyuria, fever, N/V/D, and chills. 20G IV obtained in LAC, labs obtained, flushing without resistance, dressing dry and intact. Medicated as ordered, safety maintained.

## 2021-06-30 NOTE — ED ADULT TRIAGE NOTE - CHIEF COMPLAINT QUOTE
states" I am having pain in my chest going in to my back and I have flank pain on my left side along with difficulty in urinating since 2 days".

## 2021-06-30 NOTE — ED PROVIDER NOTE - PROGRESS NOTE DETAILS
Luís Gleason DO: spoke with Dr Rios Luís Gleason DO: spoke with Dr Rios [phone] Luís Gleason DO: spoke with Dr Rios objoyce/ endometriosis specialist [phone]. patient last seen in may. discussed todays. results agrees symptoms could be consistent with ongoing endometriosis. recommended to caution  against TVUS as potential to increase pain/cramping with limited further diagnostic benefit given CT. recommended patient follow up in their office for discussions on further management. patient initially requested tvus but given Dr Rios's recommendations agreed to not have it performed. patient requesting nephro f/u because of her flank pain. explained I don't see a clear indication for her to follow up but given [facility] referral list. pain meds/ pain control discussed. Return precautions were discussed with patient and girlfriend at bedside and patient expressed understanding. all questions answered to their satisfaction.

## 2021-06-30 NOTE — ED PROVIDER NOTE - MUSCULOSKELETAL MINIMAL EXAM
paraspinal muscle spasm in lower thoracic upper lumbar region, no midline tenderness, FROM upper and lower extremities

## 2021-06-30 NOTE — ED PROVIDER NOTE - CLINICAL SUMMARY MEDICAL DECISION MAKING FREE TEXT BOX
41 y/o F with chest pain that intermittently radiates to left arm, abdominal and flank pain that radiates down the groin that has been chronic but more severe the past 4 days. Possible endometriosis although no reports of  adenomyosis in the past. Coming in because pain is unbearable at home. +nausea but no vomit. Possible endometriosis but given thoracic and abdominal pain are severe possible aortic pathology. Plan for labs, CT, symptomatic relief and reassess.

## 2021-06-30 NOTE — ED PROVIDER NOTE - NSFOLLOWUPINSTRUCTIONS_ED_ALL_ED_FT
1) Please follow-up with Dr Stevens/Blanca. Please call today for an appointment.   2) If you have any worsening of symptoms or any other concerns please return to the ED immediately.  3) Please take the medication you were prescribed today and continue taking your home medications as directed.  4) You may have been given a copy of your labs and/or imaging.  Please go over these with your primary care doctor.

## 2021-06-30 NOTE — ED PROVIDER NOTE - PATIENT PORTAL LINK FT
You can access the FollowMyHealth Patient Portal offered by [facility] by registering at the following website: [email or web address]. By joining [facility]’s FollowMyHealth portal, you will also be able to view your health information using other applications (apps) compatible with our system.

## 2021-06-30 NOTE — ED PROVIDER NOTE - OBJECTIVE STATEMENT
41 y/o F with PMHx of endomitosis s/p exploratory laparotomy in October at [facility] presents to the ED for acute chronic chest pain, abdominal pain and flank pain. Reports having chest pain and abdominal pain for several months but more severe the past 4 days. Chest pain has b/l chest with intermittent radiation to the left arm. Pain has never completely gone away but changes in severity. Chest pain is not positional and is nonexertional. Denies SOB, cough, fever. Abdominal pain is worse on the left side and flank pain radiates down her groin. Denies numbness, weakness. Has urinary hesitance  but is able to completely void. Denies hematuria, dysuria. Has normal daily DM. Last took Naproxen this morning. Is on Norethindrone to help regulate her cycle and for endomitosis. PSHx cholecystectomy and appendectomy. Reports getting acne from Vicodin but no other allergic actions 39 y/o F with PMHx of endomitosis s/p exploratory laparotomy in October at [facility] presents to the ED for acute on chronic chest pain, abdominal pain and flank pain. Reports having chest pain and abdominal pain for several months but more severe the past 4 days. Chest pain has b/l chest with intermittent radiation to the left arm. Pain has never completely gone away but changes in severity. Chest pain is not positional and is nonexertional. Denies SOB, cough, fever. Abdominal pain is worse on the left side and flank pain radiates down her groin. Denies numbness, weakness. Has urinary hesitance  but is able to completely void. Denies hematuria, dysuria. Has normal daily BMs. Last took Naproxen this morning. Is on Norethindrone to help regulate her cycle and for endomitosis. PSHx cholecystectomy and appendectomy. Reports getting acne from Vicodin but no other allergic actions

## 2021-07-03 DIAGNOSIS — Z90.49 ACQUIRED ABSENCE OF OTHER SPECIFIED PARTS OF DIGESTIVE TRACT: Chronic | ICD-10-CM

## 2021-07-04 RX ORDER — CEPHALEXIN 500 MG
1 CAPSULE ORAL
Qty: 20 | Refills: 0
Start: 2021-07-04 | End: 2021-07-08

## 2021-07-04 NOTE — ED POST DISCHARGE NOTE - RESULT SUMMARY
UCX : E. Coli 10,000-49,000CFU/ml No antibiotic listed in ED provider note or prescription writer at time of discharge. Patient contact # [phone] patient C/O dysuria , no fever no hematuria. Discussed with patient will ERX Cephalexin 500mg QID X 5 days. Patient to follow up with MD for repeat UA/UCX. Discussed with patient need to return to ED if symptoms don't continue to improve or recur or develops any new or worsening symptoms that are of concern.

## 2021-07-08 ENCOUNTER — EMERGENCY (EMERGENCY)
Facility: HOSPITAL | Age: 41
LOS: 1 days | Discharge: HOME | End: 2021-07-08
Attending: EMERGENCY MEDICINE | Admitting: EMERGENCY MEDICINE
Payer: COMMERCIAL

## 2021-07-08 VITALS
HEART RATE: 85 BPM | DIASTOLIC BLOOD PRESSURE: 65 MMHG | HEIGHT: 63 IN | RESPIRATION RATE: 18 BRPM | SYSTOLIC BLOOD PRESSURE: 125 MMHG | OXYGEN SATURATION: 98 % | TEMPERATURE: 98 F

## 2021-07-08 VITALS
OXYGEN SATURATION: 98 % | TEMPERATURE: 98 F | HEART RATE: 73 BPM | RESPIRATION RATE: 18 BRPM | SYSTOLIC BLOOD PRESSURE: 117 MMHG | DIASTOLIC BLOOD PRESSURE: 58 MMHG

## 2021-07-08 DIAGNOSIS — Z90.49 ACQUIRED ABSENCE OF OTHER SPECIFIED PARTS OF DIGESTIVE TRACT: Chronic | ICD-10-CM

## 2021-07-08 DIAGNOSIS — O00.90 UNSPECIFIED ECTOPIC PREGNANCY WITHOUT INTRAUTERINE PREGNANCY: Chronic | ICD-10-CM

## 2021-07-08 LAB
ALBUMIN SERPL ELPH-MCNC: 5 G/DL — SIGNIFICANT CHANGE UP (ref 3.3–5)
ALP SERPL-CCNC: 34 U/L — LOW (ref 40–120)
ALT FLD-CCNC: 37 U/L — HIGH (ref 4–33)
ANION GAP SERPL CALC-SCNC: 15 MMOL/L — HIGH (ref 7–14)
APPEARANCE UR: CLEAR — SIGNIFICANT CHANGE UP
AST SERPL-CCNC: 17 U/L — SIGNIFICANT CHANGE UP (ref 4–32)
B PERT DNA SPEC QL NAA+PROBE: SIGNIFICANT CHANGE UP
BASOPHILS # BLD AUTO: 0.04 K/UL — SIGNIFICANT CHANGE UP (ref 0–0.2)
BASOPHILS NFR BLD AUTO: 0.7 % — SIGNIFICANT CHANGE UP (ref 0–2)
BILIRUB SERPL-MCNC: 0.6 MG/DL — SIGNIFICANT CHANGE UP (ref 0.2–1.2)
BILIRUB UR-MCNC: NEGATIVE — SIGNIFICANT CHANGE UP
BUN SERPL-MCNC: 10 MG/DL — SIGNIFICANT CHANGE UP (ref 7–23)
C PNEUM DNA SPEC QL NAA+PROBE: SIGNIFICANT CHANGE UP
CALCIUM SERPL-MCNC: 9.6 MG/DL — SIGNIFICANT CHANGE UP (ref 8.4–10.5)
CHLORIDE SERPL-SCNC: 100 MMOL/L — SIGNIFICANT CHANGE UP (ref 98–107)
CO2 SERPL-SCNC: 24 MMOL/L — SIGNIFICANT CHANGE UP (ref 22–31)
COLOR SPEC: YELLOW — SIGNIFICANT CHANGE UP
CREAT SERPL-MCNC: 0.76 MG/DL — SIGNIFICANT CHANGE UP (ref 0.5–1.3)
DIFF PNL FLD: NEGATIVE — SIGNIFICANT CHANGE UP
EOSINOPHIL # BLD AUTO: 0.09 K/UL — SIGNIFICANT CHANGE UP (ref 0–0.5)
EOSINOPHIL NFR BLD AUTO: 1.6 % — SIGNIFICANT CHANGE UP (ref 0–6)
FLUAV SUBTYP SPEC NAA+PROBE: SIGNIFICANT CHANGE UP
FLUBV RNA SPEC QL NAA+PROBE: SIGNIFICANT CHANGE UP
GLUCOSE SERPL-MCNC: 90 MG/DL — SIGNIFICANT CHANGE UP (ref 70–99)
GLUCOSE UR QL: NEGATIVE — SIGNIFICANT CHANGE UP
HADV DNA SPEC QL NAA+PROBE: SIGNIFICANT CHANGE UP
HCOV 229E RNA SPEC QL NAA+PROBE: SIGNIFICANT CHANGE UP
HCOV HKU1 RNA SPEC QL NAA+PROBE: SIGNIFICANT CHANGE UP
HCOV NL63 RNA SPEC QL NAA+PROBE: SIGNIFICANT CHANGE UP
HCOV OC43 RNA SPEC QL NAA+PROBE: SIGNIFICANT CHANGE UP
HCT VFR BLD CALC: 37.9 % — SIGNIFICANT CHANGE UP (ref 34.5–45)
HGB BLD-MCNC: 12.9 G/DL — SIGNIFICANT CHANGE UP (ref 11.5–15.5)
HMPV RNA SPEC QL NAA+PROBE: SIGNIFICANT CHANGE UP
HPIV1 RNA SPEC QL NAA+PROBE: SIGNIFICANT CHANGE UP
HPIV2 RNA SPEC QL NAA+PROBE: SIGNIFICANT CHANGE UP
HPIV3 RNA SPEC QL NAA+PROBE: SIGNIFICANT CHANGE UP
HPIV4 RNA SPEC QL NAA+PROBE: SIGNIFICANT CHANGE UP
IANC: 3.32 K/UL — SIGNIFICANT CHANGE UP (ref 1.5–8.5)
IMM GRANULOCYTES NFR BLD AUTO: 0.2 % — SIGNIFICANT CHANGE UP (ref 0–1.5)
KETONES UR-MCNC: NEGATIVE — SIGNIFICANT CHANGE UP
LEUKOCYTE ESTERASE UR-ACNC: NEGATIVE — SIGNIFICANT CHANGE UP
LYMPHOCYTES # BLD AUTO: 1.94 K/UL — SIGNIFICANT CHANGE UP (ref 1–3.3)
LYMPHOCYTES # BLD AUTO: 33.6 % — SIGNIFICANT CHANGE UP (ref 13–44)
MCHC RBC-ENTMCNC: 31.9 PG — SIGNIFICANT CHANGE UP (ref 27–34)
MCHC RBC-ENTMCNC: 34 GM/DL — SIGNIFICANT CHANGE UP (ref 32–36)
MCV RBC AUTO: 93.8 FL — SIGNIFICANT CHANGE UP (ref 80–100)
MONOCYTES # BLD AUTO: 0.37 K/UL — SIGNIFICANT CHANGE UP (ref 0–0.9)
MONOCYTES NFR BLD AUTO: 6.4 % — SIGNIFICANT CHANGE UP (ref 2–14)
NEUTROPHILS # BLD AUTO: 3.32 K/UL — SIGNIFICANT CHANGE UP (ref 1.8–7.4)
NEUTROPHILS NFR BLD AUTO: 57.5 % — SIGNIFICANT CHANGE UP (ref 43–77)
NITRITE UR-MCNC: NEGATIVE — SIGNIFICANT CHANGE UP
NRBC # BLD: 0 /100 WBCS — SIGNIFICANT CHANGE UP
NRBC # FLD: 0 K/UL — SIGNIFICANT CHANGE UP
PH UR: 7 — SIGNIFICANT CHANGE UP (ref 5–8)
PLATELET # BLD AUTO: 268 K/UL — SIGNIFICANT CHANGE UP (ref 150–400)
POTASSIUM SERPL-MCNC: 3.4 MMOL/L — LOW (ref 3.5–5.3)
POTASSIUM SERPL-SCNC: 3.4 MMOL/L — LOW (ref 3.5–5.3)
PROT SERPL-MCNC: 8 G/DL — SIGNIFICANT CHANGE UP (ref 6–8.3)
PROT UR-MCNC: ABNORMAL
RAPID RVP RESULT: SIGNIFICANT CHANGE UP
RBC # BLD: 4.04 M/UL — SIGNIFICANT CHANGE UP (ref 3.8–5.2)
RBC # FLD: 11.5 % — SIGNIFICANT CHANGE UP (ref 10.3–14.5)
RSV RNA SPEC QL NAA+PROBE: SIGNIFICANT CHANGE UP
RV+EV RNA SPEC QL NAA+PROBE: SIGNIFICANT CHANGE UP
SARS-COV-2 RNA SPEC QL NAA+PROBE: SIGNIFICANT CHANGE UP
SODIUM SERPL-SCNC: 139 MMOL/L — SIGNIFICANT CHANGE UP (ref 135–145)
SP GR SPEC: 1.02 — SIGNIFICANT CHANGE UP (ref 1.01–1.02)
UROBILINOGEN FLD QL: SIGNIFICANT CHANGE UP
WBC # BLD: 5.77 K/UL — SIGNIFICANT CHANGE UP (ref 3.8–10.5)
WBC # FLD AUTO: 5.77 K/UL — SIGNIFICANT CHANGE UP (ref 3.8–10.5)

## 2021-07-08 PROCEDURE — 99284 EMERGENCY DEPT VISIT MOD MDM: CPT

## 2021-07-08 RX ORDER — CEFPODOXIME PROXETIL 100 MG
1 TABLET ORAL
Qty: 14 | Refills: 0
Start: 2021-07-08 | End: 2021-07-14

## 2021-07-08 NOTE — ED PROVIDER NOTE - OBJECTIVE STATEMENT
40 year old FM with PMHx of endomitosis presents to the ED for acute on chronic flank pain,  dysuria, increased difficulty with urination. Patient reports she was in the ED last Wednesday and dx with UTI and given keflex which patient reports compliance. patient reports since that time patient has had worsening bilat flank pain, increased difficulty urinating, and new burning with urination. patient denies hematuria, change in odor, cloudiness. patient denies chest pain, Shortness of Breath, worsening abdominal pain, Nausea/Vomiting/Diarrhea, dizziness, weakness, confusion, vision changes, syncope, falls, trauma, discharge, bleeding, fevers.

## 2021-07-08 NOTE — ED ADULT TRIAGE NOTE - CHIEF COMPLAINT QUOTE
p/t c/o of bilateral flank pain for few days, p/t c/o of nausea vomiting  with dysuria as well, seen in Ed few days ago for same issue,

## 2021-07-08 NOTE — ED PROVIDER NOTE - PHYSICAL EXAMINATION
On Physical Exam:  General: well appearing, in NAD, speaking clearly in full sentences and without difficulty; cooperative with exam  HEENT: PERRL, MMM  Neck: no neck tenderness, no nuchal rigidity  Cardiac: normal s1, s2; RRR; no MGR  Lungs: CTABL  Abdomen: soft nontender/nondistended  : b/l flank pain, no bladder tenderness or distension  Skin: warm, intact, no rash  Extremities: no peripheral edema, no gross deformities  Neuro: no gross neurologic deficits

## 2021-07-08 NOTE — ED PROVIDER NOTE - PROGRESS NOTE DETAILS
Pt reassessed at bedside, feels well, still has some pain but pain controlled patient does not want anything for pain. Informed of workup in ED, reviewed lab and/or radiology results (when applicable) with patient/caregiver. Informed pt of plan for discharge with instructions to follow up with PMD. Pt/caregiver expressed understanding of plan and agrees with plan for discharge. Strict return precautions discussed with patient in layman's terms, patient demonstrated understanding of return precautions. Nazario Whatley PA-C

## 2021-07-08 NOTE — ED PROVIDER NOTE - NS ED ROS FT
Review of Systems:  · Constitutional: no chills, no fever, no night sweats, no weight loss  · Nose: no epistaxis  · Mouth/Throat: no difficulty in swallowing, trachea midline, uvula midline  . Cardio: No CP, no palpitations, no chest pressure, no ripping chest pain  · Respiratory: no cough, no exertional dyspnea, no hemoptysis, no orthopnea, no shortness of breath  · Gastrointestinal: no abdominal pain, no diarrhea, no melena, no nausea, no vomiting  · Genitourinary: difficulty urinating, dysuria, b/l flank pain, no hematuria  · MUSCULOSKELETAL: FROM of all extremities  · Skin: no abrasion; no bruising; no laceration  · Neurological: no change in level of consciousness, no headache, no seizures  · ROS STATEMENT: all other ROS negative except as per HPI

## 2021-07-08 NOTE — ED PROVIDER NOTE - NSFOLLOWUPINSTRUCTIONS_ED_ALL_ED_FT
There are no signs of emergency conditions requiring admission to the hospital on today's workup.  Based on the evaluation, a presumptive diagnosis was made, however, further evaluation may be required by your primary care physician or a specialist for a more definitive diagnosis.  Therefore, please follow-up as directed or return to the Emergency Department if your symptoms change or worsen.    We recommend that you:  1. See your primary care physician within the next 72 hours for follow up.  Bring a copy of your discharge paperwork (including any test results) to your doctor.  2. Please follow up with your specialists regarding this chronic pain  3. Please take 1 tablet cefpodoxime 200mg twice a day for 7 days.   4. Please maintain hydration and have healthy meals as tolerated.       *** Return immediately if you have worsening symptoms, chest pain, Shortness of Breath, abdominal pain, Nausea/Vomiting/Diarrhea, dizziness, weakness, confusion, vision changes, urinary symptoms, syncope, falls, trauma, discharge, bleeding, fevers, or any other new/concerning symptoms. *** There are no signs of emergency conditions requiring admission to the hospital on today's workup.  Based on the evaluation, a presumptive diagnosis was made, however, further evaluation may be required by your primary care physician or a specialist for a more definitive diagnosis.  Therefore, please follow-up as directed or return to the Emergency Department if your symptoms change or worsen.    We recommend that you:  1. See your primary care physician within the next 72 hours for follow up.  Bring a copy of your discharge paperwork (including any test results) to your doctor.  2. Please follow up with your specialists regarding this chronic pain  3. Please take 1 tablet cefpodoxime 100mg twice a day for 7 days.   4. Please maintain hydration and have healthy meals as tolerated.       *** Return immediately if you have worsening symptoms, chest pain, Shortness of Breath, abdominal pain, Nausea/Vomiting/Diarrhea, dizziness, weakness, confusion, vision changes, urinary symptoms, syncope, falls, trauma, discharge, bleeding, fevers, or any other new/concerning symptoms. ***

## 2021-07-08 NOTE — ED PROVIDER NOTE - PATIENT PORTAL LINK FT
You can access the FollowMyHealth Patient Portal offered by [facility] by registering at the following website: [email or web address]. By joining [facility]’s FollowMyHealth portal, you will also be able to view your health information using other applications (apps) compatible with our system.

## 2021-07-08 NOTE — ED PROVIDER NOTE - CLINICAL SUMMARY MEDICAL DECISION MAKING FREE TEXT BOX
40 year old fm with pmhx of endometrosis presents with worsening urinary symptoms and flank pain since recent dx of UTI last week. will obtain labs, urine, r/o pyleonephritis, likely abx failure. will likely switch abx, CDU obs and reassess 40 year old fm with pmhx of endometrosis presents with worsening urinary symptoms and R>L flank pain since recent dx of UTI last week. will obtain labs, urine, r/o pyleonephritis, likely abx failure. Rpt labs and urine, reassess

## 2021-07-08 NOTE — ED PROVIDER NOTE - ATTENDING CONTRIBUTION TO CARE
Pt with h/o complex pain from endometriosis s/p ex lap, p/w dysuria and b/l flank pain L>R. Pt was seen in ED for similar symptoms one week ago in this ED and had CTA chest/abd/pelvis which was neg for acute pathology. Ucx from that visit grew out e.coli and pt was started on keflex which she has been taking but reports dysuria worsening. She denies f/c/s. On exam, she is diffusely tender to abd and flanks, no r/g. Non toxic appearing. Appears comfortable at rest. VSS. Labs essentially unremarkable, UA unreliable in setting of current abx use, however, given worsening dysuria will switch from keflex to cefpodoxime for broader coverage. Given normal labs and recent CTA, no indication at this time for rpt imaging. Pt marycruz likely need abdominal MRI, she has a PCP appointment in 2 hours, will print results of lab work and recent CTA and return precautions discussed.

## 2021-07-09 LAB
CULTURE RESULTS: NO GROWTH — SIGNIFICANT CHANGE UP
SPECIMEN SOURCE: SIGNIFICANT CHANGE UP

## 2022-02-01 NOTE — ED ADULT TRIAGE NOTE - SPO2 (%)
Called patient to schedule the appointment from the no show/cancelation work que. No answer, Voice mail full, sent my chart message  
98

## 2022-10-11 ENCOUNTER — APPOINTMENT (OUTPATIENT)
Dept: UROLOGY | Facility: CLINIC | Age: 42
End: 2022-10-11

## 2022-10-11 VITALS
DIASTOLIC BLOOD PRESSURE: 70 MMHG | HEIGHT: 62 IN | SYSTOLIC BLOOD PRESSURE: 118 MMHG | HEART RATE: 69 BPM | BODY MASS INDEX: 23.92 KG/M2 | WEIGHT: 130 LBS | TEMPERATURE: 98.3 F

## 2022-10-11 DIAGNOSIS — R31.21 ASYMPTOMATIC MICROSCOPIC HEMATURIA: ICD-10-CM

## 2022-10-11 DIAGNOSIS — Z00.00 ENCOUNTER FOR GENERAL ADULT MEDICAL EXAMINATION W/OUT ABNORMAL FINDINGS: ICD-10-CM

## 2022-10-11 PROCEDURE — 99204 OFFICE O/P NEW MOD 45 MIN: CPT

## 2022-10-11 NOTE — HISTORY OF PRESENT ILLNESS
[FreeTextEntry1] : Dr. Juan Stevens\par [address] [place] [id number]\par \par CC: Left flank pain \par \par 10/2020 surgery with Dr. Stevens for endo; 6 hour operation, ~28 endometrial lesions removed.  Operative report notes: "Hysteroscopy, endometrial curettage, videooperative multipuncture laparoscopy, bilateral ureterolysis, bilateral decompression  of the hypogastric nerve trunk, excision of endometriosis of the rectum, exploration of the diaphragm, laparoscopic appendectomy, excision of both pelvic sidewall and decompression of the retroperitoneal periureteral fibrosis.  Cystoscopy and ICG green  instillation"  Pathology with multiple endometriosis deposits including  "right ureter bridge, excision: focal endometriosis present in fibroadipose tissue" \par \par She walks with cane and intermittently loses some motor control of left leg\par \par She has constant left flank pain. It feels like a stabbing pain when acute, and when it is constant it is dull and "strangling pain" associated with nausea and no appetite.  She has had episodes of emesis with the pain.  This began after her endometrial surgery.  This can be severe and acute.  These symptoms are exacerbated by ovulation/menses.  The symptoms are worse now than compared to a year ago. \par \par She also has frequency/urgency but when voids there is minimal urine, flow is slow and "like a trincle". No dysuria or pain with urination, no gross hematuria. \par She does have urinary hesitancy.   She states she has microhematuria.  \par  \par 2021 (after endo surgery); no renal stones or hydronephrosis.   No imaging in the last ~ 1 year. \par \par 2022 Creatinine 0.72\par 2022: UA 10-20 RBC, 0-5 squamous, no bacteria\par \par SURG: endometrial surgery noted above, appy, suman,  from ectopic pregnancy, right salpingectomy\par FAMHX: mother with endometriosis, nephrolithiasis\par SOCIAL: osteopath, nonsmoker (prior smoker quit in  after smoking for 7 years) \par ROS: negative 10 system review other than above\par \par

## 2022-10-24 LAB
APPEARANCE: CLEAR
BACTERIA UR CULT: NORMAL
BACTERIA: NEGATIVE
BILIRUBIN URINE: NEGATIVE
BLOOD URINE: NEGATIVE
COLOR: NORMAL
GLUCOSE QUALITATIVE U: NEGATIVE
HYALINE CASTS: 2 /LPF
KETONES URINE: NEGATIVE
LEUKOCYTE ESTERASE URINE: NEGATIVE
MICROSCOPIC-UA: NORMAL
NITRITE URINE: NEGATIVE
PH URINE: 7
PROTEIN URINE: NEGATIVE
RED BLOOD CELLS URINE: 3 /HPF
SPECIFIC GRAVITY URINE: 1.02
SQUAMOUS EPITHELIAL CELLS: 2 /HPF
URINE CYTOLOGY: NORMAL
UROBILINOGEN URINE: NORMAL
WHITE BLOOD CELLS URINE: 1 /HPF

## 2022-11-01 ENCOUNTER — APPOINTMENT (OUTPATIENT)
Dept: UROLOGY | Facility: CLINIC | Age: 42
End: 2022-11-01

## 2022-11-01 VITALS
RESPIRATION RATE: 16 BRPM | DIASTOLIC BLOOD PRESSURE: 71 MMHG | SYSTOLIC BLOOD PRESSURE: 111 MMHG | TEMPERATURE: 97.8 F | OXYGEN SATURATION: 100 % | HEART RATE: 65 BPM

## 2022-11-01 PROCEDURE — 52000 CYSTOURETHROSCOPY: CPT

## 2023-07-05 ENCOUNTER — APPOINTMENT (OUTPATIENT)
Dept: RHEUMATOLOGY | Facility: CLINIC | Age: 43
End: 2023-07-05

## 2023-09-28 NOTE — ED PROVIDER NOTE - IV ALTEPLASE EXCL REL HIDDEN
dilTIAZem (CARDIZEM CD) 240 MG 24 hr capsule  Last office visit date: 7/19/2023  Next appointment scheduled?: Yes   Number of refills given: 3    
show

## 2024-02-07 ENCOUNTER — APPOINTMENT (OUTPATIENT)
Dept: OBGYN | Facility: CLINIC | Age: 44
End: 2024-02-07
Payer: COMMERCIAL

## 2024-02-07 VITALS
BODY MASS INDEX: 25.34 KG/M2 | HEIGHT: 63 IN | DIASTOLIC BLOOD PRESSURE: 62 MMHG | SYSTOLIC BLOOD PRESSURE: 109 MMHG | WEIGHT: 143 LBS

## 2024-02-07 DIAGNOSIS — N80.9 ENDOMETRIOSIS, UNSPECIFIED: ICD-10-CM

## 2024-02-07 DIAGNOSIS — R39.9 UNSPECIFIED SYMPTOMS AND SIGNS INVOLVING THE GENITOURINARY SYSTEM: ICD-10-CM

## 2024-02-07 DIAGNOSIS — Z01.419 ENCOUNTER FOR GYNECOLOGICAL EXAMINATION (GENERAL) (ROUTINE) W/OUT ABNORMAL FINDINGS: ICD-10-CM

## 2024-02-07 DIAGNOSIS — R10.2 PELVIC AND PERINEAL PAIN: ICD-10-CM

## 2024-02-07 LAB
BILIRUB UR QL STRIP: NORMAL
CLARITY UR: CLEAR
GLUCOSE UR-MCNC: NORMAL
HCG UR QL: 0.2 EU/DL
HGB UR QL STRIP.AUTO: NORMAL
KETONES UR-MCNC: NORMAL
LEUKOCYTE ESTERASE UR QL STRIP: NORMAL
NITRITE UR QL STRIP: NORMAL
PH UR STRIP: 7
PROT UR STRIP-MCNC: NORMAL
SP GR UR STRIP: 1.02

## 2024-02-07 PROCEDURE — 82270 OCCULT BLOOD FECES: CPT

## 2024-02-07 PROCEDURE — 81002 URINALYSIS NONAUTO W/O SCOPE: CPT

## 2024-02-07 PROCEDURE — 99212 OFFICE O/P EST SF 10 MIN: CPT | Mod: 25

## 2024-02-07 PROCEDURE — 99386 PREV VISIT NEW AGE 40-64: CPT

## 2024-02-07 NOTE — HISTORY OF PRESENT ILLNESS
[Currently Active] : currently active [Women] : women [No] : No [FreeTextEntry1] : 2024. EDWARD BOBBY 43 year old female  LMP 24 She presents to establish gyn care    She reports urinary retention at times.  She feels like urethra is kinked. She notes abdominal pain, at times severe,  after urination, worse on left side.   She denies pain during urination, denies h/o similar sxs w/ UTIs, had previous long h/o episodes of similar retention for many years until summer of 2023 resolved after seeing healer for 4wk in [place].  She reports pain due to endometriosis is managed w/ plant-based supplement and micro-dosing mushroom.  She sees naturopath, s/p  laparoscopic removal of 20 endometriosis lesions.  She reports heavy painful menses w/ brown clots, managed w/ Pamperin.  She takes off from work 2-3 days every month (improved from h/o taking off 2-3 days every other week), pain only temporarily improved after  endometriosis surgery, not improved w/ h/o IUD or OCP use. She denies intermenstrual bleeding. She is not interested in hormonal medications for endometriosis pain.   She reports recently palpating left breast mass, possibly around time of menses.  She denies vaginal discharge or vaginitis symptoms. BM is normal per patient, no blood in stool or constipation/diarrhea. She is in a long-term monogamous relationship w/ female partner. She sees PCP and dermatologist.  PMH: endometriosis, dysmenorrhea Meds: denies OBHx: . G1- MAB  (questionable but complete AB). G2- MAB . G3- ectopic  (ex-lap right salpingectomy) GynHx: h/o abnormal pap (uncertain when). ovarian cysts. No Hx of STI, fibroids, or pelvic infections. SHx: cholecystectomy (), lap deep excision surgery for endometriosis (20 lesions removed, 10/2020), left breast bx (benign) (2023), ex-lap right salpingectomy FHx: Paternal aunts x3 w/ breast ca @40s, no genetic testing. Father w/ lung ca (smoker). Maternal uncle w/ gallbladder and liver ca (alcoholic). Sister w/ pituitary tumor in brain and Lymes disease. Denies FHx of pancreatic, prostate, ovarian, uterine or colon cancer. Denies [employer] heritage. All: Latex sensitivity Social: Former smoker ([age]yo). Social alcohol. denies drug use. Dating Sol Payne. [Mammogramdate] : 06/23 [TextBox_19] : w/ left breast bx (benign), recommended b/l 6 month f/u [BreastSonogramDate] : 06/23

## 2024-02-07 NOTE — PLAN
[FreeTextEntry1] : Establishing Gyn Exam: BSA, calcium, vitamin D and exercise d/w pt Pap/HPV w/ GC/Chl conducted Rx given for mammogram and breast sonogram Advised pt to see PCP and dermatologist annually  Urinary retention: UA/UCx done today Referral to urogyn given - if no infection should have work up  Endometriosis, dysmenorrhea, h/o ovarian cysts: possibly 2/2 scar tissue from endometriosis surgery 2020 Advised pt to schedule pelvic sonogram Referred to pelvic floor PT given Referral to endometriosis specialist given  Abnormal Mammo 06/23: No masses palpated on exam Pt to send record to our office Rx given for diagnostic mammogram and breast sonogram  Paternal aunts x3 w/ breast ca @40s: D/w pt getting genetic testing Advised pt to schedule genetic consultation   RTO for TVUS or PRN

## 2024-02-07 NOTE — PHYSICAL EXAM
[Chaperone Present] : A chaperone was present in the examining room during all aspects of the physical examination [Appropriately responsive] : appropriately responsive [Alert] : alert [No Acute Distress] : no acute distress [No Lymphadenopathy] : no lymphadenopathy [Regular Rate Rhythm] : regular rate rhythm [No Murmurs] : no murmurs [Clear to Auscultation B/L] : clear to auscultation bilaterally [Soft] : soft [Non-distended] : non-distended [No HSM] : No HSM [No Lesions] : no lesions [No Mass] : no mass [Oriented x3] : oriented x3 [Examination Of The Breasts] : a normal appearance [No Masses] : no breast masses were palpable [Labia Minora] : normal [Labia Majora] : normal [Normal] : normal [Uterine Adnexae] : normal [FreeTextEntry6] : no masses palpated [FreeTextEntry7] : b/l adnexal tenderness [FreeTextEntry9] : Guaiac negative. No masses noted.

## 2024-02-09 LAB
BACTERIA UR CULT: NORMAL
C TRACH RRNA SPEC QL NAA+PROBE: NOT DETECTED
HPV HIGH+LOW RISK DNA PNL CVX: NOT DETECTED
N GONORRHOEA RRNA SPEC QL NAA+PROBE: NOT DETECTED
SOURCE TP AMPLIFICATION: NORMAL

## 2024-02-12 LAB — CYTOLOGY CVX/VAG DOC THIN PREP: NORMAL

## 2024-02-20 ENCOUNTER — NON-APPOINTMENT (OUTPATIENT)
Age: 44
End: 2024-02-20

## 2024-03-06 ENCOUNTER — APPOINTMENT (OUTPATIENT)
Dept: OBGYN | Facility: CLINIC | Age: 44
End: 2024-03-06

## 2024-04-24 NOTE — ED PROVIDER NOTE - ATTESTATION, MLM
Talia: pt feeling much better after medications, labs/CT reassuring. Pt to f/u with neurology as outpatient. TBDC I have reviewed and confirmed nurses' notes for patient's medications, allergies, medical history, and surgical history.

## 2024-06-24 DIAGNOSIS — R92.8 OTHER ABNORMAL AND INCONCLUSIVE FINDINGS ON DIAGNOSTIC IMAGING OF BREAST: ICD-10-CM

## 2024-06-30 PROBLEM — R92.8 ABNORMAL FINDING ON BREAST IMAGING: Status: ACTIVE | Noted: 2024-06-30

## 2024-07-01 ENCOUNTER — NON-APPOINTMENT (OUTPATIENT)
Age: 44
End: 2024-07-01

## 2024-08-02 ENCOUNTER — APPOINTMENT (OUTPATIENT)
Dept: ORTHOPEDIC SURGERY | Facility: CLINIC | Age: 44
End: 2024-08-02
Payer: COMMERCIAL

## 2024-08-02 VITALS — HEIGHT: 63 IN | WEIGHT: 149 LBS | BODY MASS INDEX: 26.4 KG/M2

## 2024-08-02 DIAGNOSIS — S93.509A UNSPECIFIED SPRAIN OF UNSPECIFIED TOE(S), INITIAL ENCOUNTER: ICD-10-CM

## 2024-08-02 DIAGNOSIS — Z78.9 OTHER SPECIFIED HEALTH STATUS: ICD-10-CM

## 2024-08-02 PROCEDURE — 73660 X-RAY EXAM OF TOE(S): CPT | Mod: LT

## 2024-08-02 PROCEDURE — 99203 OFFICE O/P NEW LOW 30 MIN: CPT

## 2024-08-02 NOTE — HISTORY OF PRESENT ILLNESS
[8] : 8 [de-identified] :  08/02/2024: She presents for initial orthopedic evaluation injury to the left great toe.  He states earlier today a heavy door struck the left toe now with pain swelling located at the IP joint of the left great toe she is ambulating with a limp  She works as a physician  Denies contributory past medical history [] : no [FreeTextEntry5] : States she opened heavy door on her LT big toe today. Experiencing sharp/throbbing pain and sensitivity to touch. Some minor bruising.

## 2024-08-02 NOTE — ASSESSMENT
[FreeTextEntry1] : Discussed the role of ice and as needed Motrin 600 mg twice daily Discussed the role of buddy tape and use of hard soled shoe Follow-up with foot service in 3 weeks if pain persists

## 2024-08-02 NOTE — IMAGING
[de-identified] : Left foot/great toe-she has mild ecchymosis and swelling at the left great toe IP joint, tenderness to palpation IP joint she is stable at the MTP joint, she is ambulating with a limp  X-rays taken today of the left great toe 2 views-no fractures or dislocations appreciated

## 2024-09-16 DIAGNOSIS — R92.8 OTHER ABNORMAL AND INCONCLUSIVE FINDINGS ON DIAGNOSTIC IMAGING OF BREAST: ICD-10-CM

## 2024-09-19 ENCOUNTER — ASOB RESULT (OUTPATIENT)
Age: 44
End: 2024-09-19

## 2024-09-19 ENCOUNTER — APPOINTMENT (OUTPATIENT)
Dept: OBGYN | Facility: CLINIC | Age: 44
End: 2024-09-19
Payer: COMMERCIAL

## 2024-09-19 PROCEDURE — 76830 TRANSVAGINAL US NON-OB: CPT

## 2025-05-05 ENCOUNTER — APPOINTMENT (OUTPATIENT)
Dept: OBGYN | Facility: CLINIC | Age: 45
End: 2025-05-05